# Patient Record
Sex: MALE | Race: WHITE | NOT HISPANIC OR LATINO | Employment: PART TIME | URBAN - METROPOLITAN AREA
[De-identification: names, ages, dates, MRNs, and addresses within clinical notes are randomized per-mention and may not be internally consistent; named-entity substitution may affect disease eponyms.]

---

## 2019-04-02 ENCOUNTER — APPOINTMENT (OUTPATIENT)
Dept: CT IMAGING | Facility: HOSPITAL | Age: 49
End: 2019-04-02

## 2019-04-02 ENCOUNTER — APPOINTMENT (OUTPATIENT)
Dept: GENERAL RADIOLOGY | Facility: HOSPITAL | Age: 49
End: 2019-04-02

## 2019-04-02 ENCOUNTER — HOSPITAL ENCOUNTER (EMERGENCY)
Facility: HOSPITAL | Age: 49
Discharge: HOME | End: 2019-04-02
Attending: EMERGENCY MEDICINE | Admitting: EMERGENCY MEDICINE

## 2019-04-02 VITALS
TEMPERATURE: 98.6 F | WEIGHT: 315 LBS | DIASTOLIC BLOOD PRESSURE: 98 MMHG | BODY MASS INDEX: 42.66 KG/M2 | RESPIRATION RATE: 16 BRPM | SYSTOLIC BLOOD PRESSURE: 143 MMHG | HEIGHT: 72 IN | HEART RATE: 86 BPM | OXYGEN SATURATION: 96 %

## 2019-04-02 DIAGNOSIS — Z86.69 HISTORY OF MIGRAINE: ICD-10-CM

## 2019-04-02 DIAGNOSIS — G43.909 MIGRAINE SYNDROME: Primary | ICD-10-CM

## 2019-04-02 LAB
ABO GROUP BLD: NORMAL
ALBUMIN SERPL-MCNC: 4.4 G/DL (ref 3.5–5.2)
ALBUMIN/GLOB SERPL: 1.5 G/DL
ALP SERPL-CCNC: 71 U/L (ref 39–117)
ALT SERPL W P-5'-P-CCNC: 31 U/L (ref 1–41)
ANION GAP SERPL CALCULATED.3IONS-SCNC: 13.4 MMOL/L
APTT PPP: 27.6 SECONDS (ref 22.7–35.4)
AST SERPL-CCNC: 26 U/L (ref 1–40)
BASOPHILS # BLD AUTO: 0.04 10*3/MM3 (ref 0–0.2)
BASOPHILS NFR BLD AUTO: 0.6 % (ref 0–1.5)
BILIRUB SERPL-MCNC: 0.5 MG/DL (ref 0.2–1.2)
BLD GP AB SCN SERPL QL: NEGATIVE
BUN BLD-MCNC: 15 MG/DL (ref 6–20)
BUN/CREAT SERPL: 14.3 (ref 7–25)
CALCIUM SPEC-SCNC: 9.7 MG/DL (ref 8.6–10.5)
CHLORIDE SERPL-SCNC: 102 MMOL/L (ref 98–107)
CO2 SERPL-SCNC: 26.6 MMOL/L (ref 22–29)
CREAT BLD-MCNC: 1.05 MG/DL (ref 0.76–1.27)
DEPRECATED RDW RBC AUTO: 46.2 FL (ref 37–54)
EOSINOPHIL # BLD AUTO: 0.09 10*3/MM3 (ref 0–0.4)
EOSINOPHIL NFR BLD AUTO: 1.5 % (ref 0.3–6.2)
ERYTHROCYTE [DISTWIDTH] IN BLOOD BY AUTOMATED COUNT: 14.2 % (ref 12.3–15.4)
GFR SERPL CREATININE-BSD FRML MDRD: 75 ML/MIN/1.73
GLOBULIN UR ELPH-MCNC: 3 GM/DL
GLUCOSE BLD-MCNC: 103 MG/DL (ref 65–99)
GLUCOSE BLDC GLUCOMTR-MCNC: 98 MG/DL (ref 70–130)
HCT VFR BLD AUTO: 49.1 % (ref 37.5–51)
HGB BLD-MCNC: 16.7 G/DL (ref 13–17.7)
HOLD SPECIMEN: NORMAL
HOLD SPECIMEN: NORMAL
IMM GRANULOCYTES # BLD AUTO: 0.01 10*3/MM3 (ref 0–0.05)
IMM GRANULOCYTES NFR BLD AUTO: 0.2 % (ref 0–0.5)
INR PPP: 0.95 (ref 0.9–1.1)
LYMPHOCYTES # BLD AUTO: 1.98 10*3/MM3 (ref 0.7–3.1)
LYMPHOCYTES NFR BLD AUTO: 32.1 % (ref 19.6–45.3)
MCH RBC QN AUTO: 30.4 PG (ref 26.6–33)
MCHC RBC AUTO-ENTMCNC: 34 G/DL (ref 31.5–35.7)
MCV RBC AUTO: 89.3 FL (ref 79–97)
MONOCYTES # BLD AUTO: 0.73 10*3/MM3 (ref 0.1–0.9)
MONOCYTES NFR BLD AUTO: 11.8 % (ref 5–12)
NEUTROPHILS # BLD AUTO: 3.32 10*3/MM3 (ref 1.4–7)
NEUTROPHILS NFR BLD AUTO: 53.8 % (ref 42.7–76)
NRBC BLD AUTO-RTO: 0 /100 WBC (ref 0–0)
PLATELET # BLD AUTO: 270 10*3/MM3 (ref 140–450)
PMV BLD AUTO: 9.5 FL (ref 6–12)
POTASSIUM BLD-SCNC: 4.1 MMOL/L (ref 3.5–5.2)
PROT SERPL-MCNC: 7.4 G/DL (ref 6–8.5)
PROTHROMBIN TIME: 12.3 SECONDS (ref 11.7–14.2)
RBC # BLD AUTO: 5.5 10*6/MM3 (ref 4.14–5.8)
RH BLD: POSITIVE
SODIUM BLD-SCNC: 142 MMOL/L (ref 136–145)
T&S EXPIRATION DATE: NORMAL
TROPONIN T SERPL-MCNC: <0.01 NG/ML (ref 0–0.03)
WBC NRBC COR # BLD: 6.17 10*3/MM3 (ref 3.4–10.8)
WHOLE BLOOD HOLD SPECIMEN: NORMAL
WHOLE BLOOD HOLD SPECIMEN: NORMAL

## 2019-04-02 PROCEDURE — 85610 PROTHROMBIN TIME: CPT | Performed by: EMERGENCY MEDICINE

## 2019-04-02 PROCEDURE — 99244 OFF/OP CNSLTJ NEW/EST MOD 40: CPT | Performed by: PSYCHIATRY & NEUROLOGY

## 2019-04-02 PROCEDURE — 71045 X-RAY EXAM CHEST 1 VIEW: CPT

## 2019-04-02 PROCEDURE — 96365 THER/PROPH/DIAG IV INF INIT: CPT

## 2019-04-02 PROCEDURE — 25010000002 METOCLOPRAMIDE PER 10 MG: Performed by: EMERGENCY MEDICINE

## 2019-04-02 PROCEDURE — 25010000002 MAGNESIUM SULFATE IN D5W 1G/100ML (PREMIX) 1-5 GM/100ML-% SOLUTION: Performed by: EMERGENCY MEDICINE

## 2019-04-02 PROCEDURE — 82962 GLUCOSE BLOOD TEST: CPT

## 2019-04-02 PROCEDURE — 82565 ASSAY OF CREATININE: CPT

## 2019-04-02 PROCEDURE — 86900 BLOOD TYPING SEROLOGIC ABO: CPT | Performed by: EMERGENCY MEDICINE

## 2019-04-02 PROCEDURE — 93010 ELECTROCARDIOGRAM REPORT: CPT | Performed by: INTERNAL MEDICINE

## 2019-04-02 PROCEDURE — 25010000003 LEVETIRACETAM IN NACL 0.75% 1000 MG/100ML SOLUTION: Performed by: EMERGENCY MEDICINE

## 2019-04-02 PROCEDURE — 25010000002 DIPHENHYDRAMINE PER 50 MG: Performed by: EMERGENCY MEDICINE

## 2019-04-02 PROCEDURE — 0042T HC CT CEREBRAL PERFUSION W/WO CONTRAST: CPT

## 2019-04-02 PROCEDURE — 70496 CT ANGIOGRAPHY HEAD: CPT

## 2019-04-02 PROCEDURE — 86901 BLOOD TYPING SEROLOGIC RH(D): CPT | Performed by: EMERGENCY MEDICINE

## 2019-04-02 PROCEDURE — 96375 TX/PRO/DX INJ NEW DRUG ADDON: CPT

## 2019-04-02 PROCEDURE — 99284 EMERGENCY DEPT VISIT MOD MDM: CPT

## 2019-04-02 PROCEDURE — 84484 ASSAY OF TROPONIN QUANT: CPT | Performed by: EMERGENCY MEDICINE

## 2019-04-02 PROCEDURE — 70498 CT ANGIOGRAPHY NECK: CPT

## 2019-04-02 PROCEDURE — 85730 THROMBOPLASTIN TIME PARTIAL: CPT | Performed by: EMERGENCY MEDICINE

## 2019-04-02 PROCEDURE — 86850 RBC ANTIBODY SCREEN: CPT | Performed by: EMERGENCY MEDICINE

## 2019-04-02 PROCEDURE — 96367 TX/PROPH/DG ADDL SEQ IV INF: CPT

## 2019-04-02 PROCEDURE — 85025 COMPLETE CBC W/AUTO DIFF WBC: CPT | Performed by: EMERGENCY MEDICINE

## 2019-04-02 PROCEDURE — 0 IOPAMIDOL PER 1 ML: Performed by: EMERGENCY MEDICINE

## 2019-04-02 PROCEDURE — 93005 ELECTROCARDIOGRAM TRACING: CPT | Performed by: EMERGENCY MEDICINE

## 2019-04-02 PROCEDURE — 80053 COMPREHEN METABOLIC PANEL: CPT | Performed by: EMERGENCY MEDICINE

## 2019-04-02 RX ORDER — METOCLOPRAMIDE HYDROCHLORIDE 5 MG/ML
10 INJECTION INTRAMUSCULAR; INTRAVENOUS ONCE
Status: COMPLETED | OUTPATIENT
Start: 2019-04-02 | End: 2019-04-02

## 2019-04-02 RX ORDER — SODIUM CHLORIDE 0.9 % (FLUSH) 0.9 %
10 SYRINGE (ML) INJECTION AS NEEDED
Status: DISCONTINUED | OUTPATIENT
Start: 2019-04-02 | End: 2019-04-02 | Stop reason: HOSPADM

## 2019-04-02 RX ORDER — MAGNESIUM SULFATE 1 G/100ML
1 INJECTION INTRAVENOUS ONCE
Status: COMPLETED | OUTPATIENT
Start: 2019-04-02 | End: 2019-04-02

## 2019-04-02 RX ORDER — DIPHENHYDRAMINE HYDROCHLORIDE 50 MG/ML
25 INJECTION INTRAMUSCULAR; INTRAVENOUS ONCE
Status: COMPLETED | OUTPATIENT
Start: 2019-04-02 | End: 2019-04-02

## 2019-04-02 RX ORDER — LEVETIRACETAM 10 MG/ML
1000 INJECTION INTRAVASCULAR ONCE
Status: COMPLETED | OUTPATIENT
Start: 2019-04-02 | End: 2019-04-02

## 2019-04-02 RX ADMIN — DIPHENHYDRAMINE HYDROCHLORIDE 25 MG: 50 INJECTION INTRAMUSCULAR; INTRAVENOUS at 19:51

## 2019-04-02 RX ADMIN — IOPAMIDOL 150 ML: 755 INJECTION, SOLUTION INTRAVENOUS at 19:03

## 2019-04-02 RX ADMIN — LEVETIRACETAM 1000 MG: 10 INJECTION INTRAVENOUS at 19:47

## 2019-04-02 RX ADMIN — MAGNESIUM SULFATE HEPTAHYDRATE 1 G: 1 INJECTION, SOLUTION INTRAVENOUS at 20:01

## 2019-04-02 RX ADMIN — METOCLOPRAMIDE 10 MG: 5 INJECTION, SOLUTION INTRAMUSCULAR; INTRAVENOUS at 19:57

## 2019-04-02 NOTE — ED NOTES
"Patient reports migraine onset 2 hours ago, no weakness, slowed and slurred speech, blind spots in right visual field, patient states \"I was reading, could see it but not processing it\" no numbness, reports left side tingle, \"but barely noticeable\"   Reports hx of this episode 25 years ago, but does not remember having the slow speech      Michaela Moreno, RN  04/02/19 1929    "

## 2019-04-02 NOTE — CONSULTS
Neurology Consult Note    Consult Date: 4/2/2019    Referring MD: Dr. Gupta    Reason for Consult I have been asked to see the patient in neurological consultation to render advice and opinion regarding acute vision loss, word finding difficulty    Bossman De La Rosa is a 48 y.o. male with PMH of migraine with aura, DVT, no prior history of CAD or stroke.  He has been having migraine with aura for about the last 25 years.  Several times a year he has an episode of mild word finding difficulty followed by pounding headache with photophobia and phonophobia.  This is usually relieved with NSAIDs.  Occasionally he has had a visual distortion on the right side of his vision.  About 2 hours ago he developed word finding difficulty and had difficulty reading.  He noticed a right-sided visual distortion that he describes as a gray field on the right half of his vision with a jagged bright line running across it.  He was able to see some text through the field but it was distorted.  His vision issues have improved but he still has a little bit of mild word finding difficulty.  He is beginning to develop a mild bifrontal aching headache with some mild photophobia and relatively severe nausea.  He came to the hospital because this is much worse than his typical migraine headache.    Past Medical/Surgical Hx:  Past Medical History:   Diagnosis Date   • DVT (deep venous thrombosis) (CMS/[facility])    • Eye exam, routine 08/2015     Past Surgical History:   Procedure Laterality Date   • COLONOSCOPY  2003    no polyps, low iron       Medications On Admission  none    Allergies:  No Known Allergies    Social Hx:  Social History     Socioeconomic History   • Marital status:      Spouse name: Not on file   • Number of children: Not on file   • Years of education: Not on file   • Highest education level: Not on file   Tobacco Use   • Smoking status: Never Smoker   Substance and Sexual Activity   • Alcohol use: No   • Drug use: Defer   •  "Sexual activity: Defer       Family Hx:  Family History   Problem Relation Age of Onset   • Cancer Mother         lung   • Diabetes Daughter    • Hypertension Maternal Grandmother    • Stroke Maternal Grandmother        Review of Systems  Constitutional: [No fevers, chills]  Eye: [No recent visual problems, eye discharge]  HEENT: [No ear pain, nasal congestion]  Respiratory: [No shortness of breath, cough]  Cardiovascular: [No Chest pain, palpitations]  Gastrointestinal: [No nausea, vomiting]  Genitourinary: [No hematuria, incontinence]  Hema/Lymph: [no nosebleeds, history of anticoagulation]  Endocrine: [Negative for excessive urination, heat or cold intolerance]  Musculoskeletal: [No back pain, neck pain]  Integumentary: [No rash, pruritus]  Neurologic: [No weakness, numbness, + HA]  Psychiatric: [No anxiety, depression]    Exam    BP (!) 145/119 (BP Location: Right arm, Patient Position: Lying)   Pulse 82   Temp 98.5 °F (36.9 °C) (Tympanic)   Resp 18   Ht 182.9 cm (72\")   Wt (!) 155 kg (341 lb)   SpO2 96%   BMI 46.25 kg/m²   gen: NAD, vitals reviewed  Eyes: fundus sharp with no papilledema or retinal hemorrhages  HEENT: no nuchal rigidity  CVS: RRR, S1, S2  MS: oriented x3, recent/remote memory intact, normal attention/concentration, language intact, no neglect, normal fund of knowledge  CN: visual acuity grossly normal, visual fields full, PERRL, EOMI, facial sensation equal, no facial droop, hearing symmetric, palate elevates symmetrically, shoulder shrug equal, tongue midline  Motor: 5/5 throughout upper and lower extremities, normal tone  Sensation: intact to vibration and temperature throughout  Reflexes: 2+ throughout upper and lower extremities, downgoing plantars  Coordination: no dysmetria with finger to nose bilaterally  Gait: no ataxia    DATA:    Lab Results   Component Value Date    GLUCOSE 103 (H) 04/02/2019    CALCIUM 9.7 04/02/2019     04/02/2019    K 4.1 04/02/2019    CO2 26.6 " 04/02/2019     04/02/2019    BUN 15 04/02/2019    CREATININE 1.05 04/02/2019    EGFRIFNONA 75 04/02/2019    BCR 14.3 04/02/2019    ANIONGAP 13.4 04/02/2019     Lab Results   Component Value Date    WBC 6.17 04/02/2019    HGB 16.7 04/02/2019    HCT 49.1 04/02/2019    MCV 89.3 04/02/2019     04/02/2019     No results found for: CHOL  No results found for: HDL  No results found for: LDL  No results found for: TRIG  No results found for: HGBA1C  Lab Results   Component Value Date    INR 0.95 04/02/2019    PROTIME 12.3 04/02/2019       Lab review: CBC, BMP unremarkable    Imaging review: I personally reviewed his CTH, CTA, CTP which were normal except an area of mild narrowing in one of his left MCA branches. I discussed these images with the reading radiologist Dr. Preston    Impression:  1) Migraine with visual aura  2) Migrainous aphasia  3) Intracranial atherosclerotic disease    Comment: History and exam consistent with migraine with aura. He's had migraine with word finding difficulty in the past, this is similar to prior episodes but the aura was more severe. He is improving now. He also had a right hemianopic distortion with fortification scotoma which has resolved and is also consistent with migraine aura. CTA shows a small area of intracranial atherosclerotic disease, potentially on the symptomatic side but I think this is more likely an incidental finding. I advised him to take magnesium as needed for migraine aura and to take ASA 81mg and have his cholesterol checked given the likely ICAD on CTA.    PLAN:  1. ASA 81mg  2. Asked him to visit his PCP and have his lipid panel checked  3. Prn magnesium 400mg for migraine aura  4. Can try migraine cocktail in the ED with magnesium, keppra, benadryl, reglan. If he improves he can go home from my standpoint. I offered him follow up with our practice.

## 2019-04-02 NOTE — ED PROVIDER NOTES
EMERGENCY DEPARTMENT ENCOUNTER    CHIEF COMPLAINT  Chief Complaint: HA  History given by: patient, family  History limited by: nothing  Room Number: 17/17  PMD: Mignon Adams MD (Inactive)      HPI:  Pt is a 48 y.o. male who presents complaining of HA that began 2 hours ago. Pt states that he also noticed that there were spots in his right fields of vision but denies numbness, weakness or additional complaint. Pt's spouse states that the pt also had word finding difficulties and memory issues (ex. could not name planets and he is normally able to do so). Pt had a similar episode 25 years ago but states his HA was much more severe previously. Pt denies having visual symptoms at that time. Pt's family states that the pt took 400mg ibuprofen for his HA with improvement of his speech.  Pt's family states that the pt has a history of migraine HAs.     Duration:  2 hours  Onset: gradual  Timing: constant  Location: generalized  Radiation: none mentioned  Quality: pressure  Intensity/Severity: moderate  Progression: improved  Associated Symptoms: word finding difficulties, memory issues, visual disturbance  Aggravating Factors: none  Alleviating Factors: none  Previous Episodes: Pt had a similar episode 25 years ago but states his HA was much more severe previously. Pt denies having visual symptoms at that time.  Treatment before arrival: Pt's family states that the pt took 400mg ibuprofen for his HA with improvement of his speech.     PAST MEDICAL HISTORY  Active Ambulatory Problems     Diagnosis Date Noted   • No Active Ambulatory Problems     Resolved Ambulatory Problems     Diagnosis Date Noted   • No Resolved Ambulatory Problems     Past Medical History:   Diagnosis Date   • DVT (deep venous thrombosis) (CMS/[facility])    • Eye exam, routine 08/2015       PAST SURGICAL HISTORY  Past Surgical History:   Procedure Laterality Date   • COLONOSCOPY  2003    no polyps, low iron       FAMILY HISTORY  Family History   Problem  Relation Age of Onset   • Cancer Mother         lung   • Diabetes Daughter    • Hypertension Maternal Grandmother    • Stroke Maternal Grandmother        SOCIAL HISTORY  Social History     Socioeconomic History   • Marital status: Legally      Spouse name: Not on file   • Number of children: Not on file   • Years of education: Not on file   • Highest education level: Not on file   Tobacco Use   • Smoking status: Never Smoker   Substance and Sexual Activity   • Alcohol use: No   • Drug use: Defer   • Sexual activity: Defer       ALLERGIES  Patient has no known allergies.    REVIEW OF SYSTEMS  Review of Systems   Constitutional: Negative for activity change, appetite change and fever.   HENT: Negative for congestion and sore throat.    Eyes: Negative.    Respiratory: Negative for cough and shortness of breath.    Cardiovascular: Negative for chest pain and leg swelling.   Gastrointestinal: Negative for abdominal pain, diarrhea and vomiting.   Endocrine: Negative.    Genitourinary: Negative for decreased urine volume and dysuria.   Musculoskeletal: Negative for neck pain.   Skin: Negative for rash and wound.   Allergic/Immunologic: Negative.    Neurological: Positive for speech difficulty and headaches. Negative for weakness and numbness.   Hematological: Negative.    Psychiatric/Behavioral: Negative.         (+) abnormal memory recall   All other systems reviewed and are negative.      PHYSICAL EXAM  ED Triage Vitals [04/02/19 1835]   Temp Heart Rate Resp BP SpO2   98.5 °F (36.9 °C) 82 18 -- 97 %      Temp src Heart Rate Source Patient Position BP Location FiO2 (%)   Tympanic -- -- -- --       Physical Exam   Constitutional: He is oriented to person, place, and time and well-developed, well-nourished, and in no distress. No distress.   HENT:   Mouth/Throat: Mucous membranes are normal.   Eyes: EOM are normal.   Cardiovascular: Normal rate and regular rhythm. Exam reveals no gallop and no friction rub.   No  murmur heard.  Pulmonary/Chest: Effort normal. No respiratory distress. He has no wheezes. He has no rhonchi. He has no rales.   Breath sounds are symmetric.   Abdominal: Soft. He exhibits no distension. There is no tenderness. There is no guarding.   Musculoskeletal: Normal range of motion. He exhibits no deformity.   Neurological: He is alert and oriented to person, place, and time. He displays abnormal speech (mild expressive aphasia).   Pt's speech is slow and deliberate. Pt reports minimal, subjective tingling in his LUE but has no decreased sensation on exam.   Skin: Skin is warm and dry.   Psychiatric:   Calm, cooperative       LAB RESULTS  Lab Results (last 24 hours)     Procedure Component Value Units Date/Time    POC Glucose Once [[id number]]  (Normal) Collected:  04/02/19 1841    Specimen:  Blood Updated:  04/02/19 1842     Glucose 98 mg/dL     CBC & Differential [[id number]] Collected:  04/02/19 1847    Specimen:  Blood Updated:  04/02/19 1904    Narrative:       The following orders were created for panel order CBC & Differential.  Procedure                               Abnormality         Status                     ---------                               -----------         ------                     CBC Auto Differential[[id number]]        Normal              Final result                 Please view results for these tests on the individual orders.    Comprehensive Metabolic Panel [[id number]]  (Abnormal) Collected:  04/02/19 1847    Specimen:  Blood Updated:  04/02/19 1922     Glucose 103 mg/dL      BUN 15 mg/dL      Creatinine 1.05 mg/dL      Sodium 142 mmol/L      Potassium 4.1 mmol/L      Chloride 102 mmol/L      CO2 26.6 mmol/L      Calcium 9.7 mg/dL      Total Protein 7.4 g/dL      Albumin 4.40 g/dL      ALT (SGPT) 31 U/L      AST (SGOT) 26 U/L      Alkaline Phosphatase 71 U/L      Total Bilirubin 0.5 mg/dL      eGFR Non African Amer 75 mL/min/1.73      Globulin 3.0 gm/dL      A/G Ratio 1.5 g/dL       BUN/Creatinine Ratio 14.3     Anion Gap 13.4 mmol/L     Narrative:       GFR Normal >60  Chronic Kidney Disease <60  Kidney Failure <15    Protime-INR [[id number]]  (Normal) Collected:  04/02/19 1847    Specimen:  Blood Updated:  04/02/19 1914     Protime 12.3 Seconds      INR 0.95    aPTT [[id number]]  (Normal) Collected:  04/02/19 1847    Specimen:  Blood Updated:  04/02/19 1914     PTT 27.6 seconds     Troponin [[id number]]  (Normal) Collected:  04/02/19 1847    Specimen:  Blood Updated:  04/02/19 1922     Troponin T <0.010 ng/mL     Narrative:       Troponin T Reference Range:  <= 0.03 ng/mL-   Negative for AMI  >0.03 ng/mL-     Abnormal for myocardial necrosis.  Clinicians would have to utilize clinical acumen, EKG, Troponin and serial changes to determine if it is an Acute Myocardial Infarction or myocardial injury due to an underlying chronic condition.     CBC Auto Differential [[id number]]  (Normal) Collected:  04/02/19 1847    Specimen:  Blood Updated:  04/02/19 1904     WBC 6.17 10*3/mm3      RBC 5.50 10*6/mm3      Hemoglobin 16.7 g/dL      Hematocrit 49.1 %      MCV 89.3 fL      MCH 30.4 pg      MCHC 34.0 g/dL      RDW 14.2 %      RDW-SD 46.2 fl      MPV 9.5 fL      Platelets 270 10*3/mm3      Neutrophil % 53.8 %      Lymphocyte % 32.1 %      Monocyte % 11.8 %      Eosinophil % 1.5 %      Basophil % 0.6 %      Immature Grans % 0.2 %      Neutrophils, Absolute 3.32 10*3/mm3      Lymphocytes, Absolute 1.98 10*3/mm3      Monocytes, Absolute 0.73 10*3/mm3      Eosinophils, Absolute 0.09 10*3/mm3      Basophils, Absolute 0.04 10*3/mm3      Immature Grans, Absolute 0.01 10*3/mm3      nRBC 0.0 /100 WBC           I ordered the above labs and reviewed the results    RADIOLOGY  XR Chest 1 View    (Results Pending)   CT Angiogram Head With & Without Contrast    (Results Pending)   CT Angiogram Neck With & Without Contrast    (Results Pending)   CT Cerebral Perfusion With & Without Contrast    (Results Pending)       CTA Head and Neck shows left MCA stenosis.     I ordered the above noted radiological studies. Interpreted by radiologist. Reviewed by me in PACS.       PROCEDURES  Procedures  1840  Interval: baseline(at presentation)  1a. Level of Consciousness: 0-->Alert, keenly responsive  1b. LOC Questions: 0-->Answers both questions correctly  1c. LOC Commands: 0-->Performs both tasks correctly  2. Best Gaze: 0-->Normal  3. Visual: 0-->No visual loss  4. Facial Palsy: 0-->Normal symmetrical movements  5a. Motor Arm, Left: 0-->No drift, limb holds 90 (or 45) degrees for full 10 secs  5b. Motor Arm, Right: 0-->No drift, limb holds 90 (or 45) degrees for full 10 secs  6a. Motor Leg, Left: 0-->No drift, leg holds 30 degree position for full 5 secs  6b. Motor Leg, Right: 0-->No drift, leg holds 30 degree position for full 5 secs  7. Limb Ataxia: 0-->Absent  8. Sensory: 0-->Normal, no sensory loss  9. Best Language: 1-->mild expressive aphasia  10. Dysarthria: 0-->Normal  11. Extinction and Inattention (formerly Neglect): 0-->No abnormality    Total (NIH Stroke Scale): 0    EKG          EKG time: 1936  Rhythm/Rate: NSR, 78  P waves and WA: unremarkable  QRS, axis: LAD, inferior Q waves   ST and T waves: no acute ischemic changes, no STEMI     Interpreted Contemporaneously by me, independently viewed  No prior for comparison    PROGRESS AND CONSULTS     1842- Discussed the pt's case with Dr. Beltrán (neurology), who recommends ordering the entire Team D protocol. He states that he will come evaluate the pt in the ED but that the pt will not likely receive tPA.    1844- Called Team D    1848- Rechecked pt. Pt is resting comfortably. Notified pt and family of my discussion with Dr. Beltrán (neurology). Discussed the plan to order lab and imaging studies for further evaluation. Pt understands and agrees with the plan, all questions answered.    1910- Dr. Beltrán is at bedside.    1920- Discussed the pt's case with Dr. Beltrán  (neurology), who states that the pt's symptoms are c/w a migraine and that he is going to talk to the pt about whether he is comfortable being treated for a migraine and discharged or admitted for further evaluation.     1935- Discussed the pt's case with Dr. Beltrán (neurology), who states that the pt is comfortable with the plan to order HA medication and for discharge. He recommends ordering magnesium, Keppra, Benadryl and Reglan prior to discharging the pt home as long as his symptoms are improved. He states that the pt should be admitted if his symptoms are not improved after medication.    1936- Ordered Reglan, Benadryl, Keppra and magnesium for MARES per Dr. Beltrán. Plan for discharge if improved.    2010 - Dr. Bryant to recheck after treatment for improvement. DC if improved, hospitalize for MRI if not per Neuro recommendation.    MEDICAL DECISION MAKING  Results were reviewed/discussed with the patient and they were also made aware of online access. Pt also made aware that some labs, such as cultures, will not be resulted during ER visit and follow up with PMD is necessary.     MDM  Number of Diagnoses or Management Options  History of migraine:   Migraine syndrome:   Diagnosis management comments: Patient with migraine vs stroke symptoms, opted to call Team D after quick discussion with Neurology Dr. Beltrán. Initial NIH 1. Dr. Beltrán evaluated imaging and patient at bedside, and feels this is more likely migraine. Have treated with migraine cocktail, plan for discharge if improved. Patient and family comfortable with this plan.        Amount and/or Complexity of Data Reviewed  Clinical lab tests: ordered and reviewed (INR=0.95)  Tests in the radiology section of CPT®: ordered and reviewed (CTA Head and Neck shows left MCA stenosis. )  Tests in the medicine section of CPT®: ordered and reviewed (See EKG procedure note)  Decide to obtain previous medical records or to obtain history from someone other  than the patient: yes  Obtain history from someone other than the patient: yes (family)  Discuss the patient with other providers: yes (Dr. Beltrán (neurology))  Independent visualization of images, tracings, or specimens: yes        Pt is not a tPA candidate due to NIHSS=1    DIAGNOSIS  Final diagnoses:   Migraine syndrome   History of migraine       DISPOSITION  Pending reevaluation after migraine cocktail. Discharge if improved. Hospitalize if no improvement.     Latest Documented Vital Signs:  As of 8:19 PM  BP- 132/84 HR- 77 Temp- 98.5 °F (36.9 °C) (Tympanic) O2 sat- 95%    --  Documentation assistance provided by nanci Dalton for Dr. Gupta.  Information recorded by the scribe was done at my direction and has been verified and validated by me.     Wendi Dalton  04/02/19 1947       Magnus Gupta MD  04/02/19 2020

## 2019-04-03 LAB — CREAT BLDA-MCNC: 0.9 MG/DL (ref 0.6–1.3)

## 2021-03-24 ENCOUNTER — BULK ORDERING (OUTPATIENT)
Dept: CASE MANAGEMENT | Facility: OTHER | Age: 51
End: 2021-03-24

## 2021-03-24 DIAGNOSIS — Z23 IMMUNIZATION DUE: ICD-10-CM

## 2024-05-09 ENCOUNTER — HOSPITAL ENCOUNTER (EMERGENCY)
Facility: HOSPITAL | Age: 54
Discharge: HOME | End: 2024-05-09
Attending: EMERGENCY MEDICINE
Payer: COMMERCIAL

## 2024-05-09 ENCOUNTER — APPOINTMENT (OUTPATIENT)
Dept: GENERAL RADIOLOGY | Facility: HOSPITAL | Age: 54
End: 2024-05-09
Payer: COMMERCIAL

## 2024-05-09 VITALS
TEMPERATURE: 97.8 F | DIASTOLIC BLOOD PRESSURE: 90 MMHG | SYSTOLIC BLOOD PRESSURE: 142 MMHG | BODY MASS INDEX: 42.66 KG/M2 | RESPIRATION RATE: 18 BRPM | HEART RATE: 90 BPM | OXYGEN SATURATION: 95 % | HEIGHT: 72 IN | WEIGHT: 315 LBS

## 2024-05-09 DIAGNOSIS — M79.672 LEFT FOOT PAIN: Primary | ICD-10-CM

## 2024-05-09 PROCEDURE — 99283 EMERGENCY DEPT VISIT LOW MDM: CPT

## 2024-05-09 PROCEDURE — 73630 X-RAY EXAM OF FOOT: CPT

## 2024-05-09 RX ORDER — IBUPROFEN 800 MG/1
800 TABLET ORAL ONCE
Status: COMPLETED | OUTPATIENT
Start: 2024-05-09 | End: 2024-05-09

## 2024-05-09 RX ADMIN — IBUPROFEN 800 MG: 800 TABLET, FILM COATED ORAL at 08:14

## 2024-05-09 NOTE — ED PROVIDER NOTES
Pt presents to the ED c/o acute progressive left foot pain after he tripped and fell over the weekend.  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  Foot pain has worsened through the week.  Continues to be able to ambulate.     On exam,   General: No acute distress, nontoxic  HEENT: EOMI  Pulm: Symmetric chest rise, nonlabored breathing  CV: Regular rate and rhythm  GI: Nondistended  MSK: No deformity, tenderness at the plantar arch towards the heel but no overt bony midfoot tenderness otherwise  Skin: Warm, dry  Neuro: Awake, alert, oriented x 4, moving all extremities, no focal deficits  Psych: Calm, cooperative    Vital signs and nursing notes reviewed.           Plan: Plan for x-ray of the foot to eval for any signs of fracture, other considerations for strain versus sprain, planta fasciitis considered as well.    ED Course as of 05/09/24 0812   Thu May 09, 2024   0750 XR Foot 3+ View Left  Per my independent interpretation of the left foot x-ray, no overtly obvious fracture noted [DC]      ED Course User Index  [DC] Magnsu Gupta MD       No evidence of fracture or dislocation, will address supportively with outpatient podiatry follow-up, ED return for worsening symptoms as needed.     MD Attestation Note    SHARED VISIT: This visit was performed by BOTH a physician and an APC. The substantive portion of the medical decision making was performed by this attesting physician who made or approved the management plan and takes responsibility for patient management. All studies in the APC note (if performed) were independently interpreted by me.                   Magnus Gupta MD  05/09/24 [id number]

## 2024-05-09 NOTE — Clinical Note
[facility] EMERGENCY DEPARTMENT  [address] [facility] [address]  Phone: [phone]    Bossman De La Rosa was seen and treated in our emergency department on 5/9/2024.  He may return to work on 05/15/2024.         Thank you for choosing [facility].    Randi Chao APRN

## 2024-05-09 NOTE — Clinical Note
[facility] EMERGENCY DEPARTMENT  [address] [place] [address]  Phone: [phone]    Bossman De La Rosa was seen and treated in our emergency department on 5/9/2024.  He may return to work on 05/13/2024.         Thank you for choosing [facility].    Magnus Gupta MD

## 2024-05-09 NOTE — ED PROVIDER NOTES
EMERGENCY DEPARTMENT ENCOUNTER  Room Number:  01/01  PCP: Mignon Adams MD (Inactive)  Independent Historians: Patient      HPI:  Chief Complaint: had concerns including Foot Pain.     A complete HPI/ROS/PMH/PSH/SH/FH are unobtainable due to:     Chronic or social conditions impacting patient care (Social Determinants of Health):       Context: Bossman De La Rosa is a 53 y.o. male with a medical history of hyperlipidemia, hypothyroidism and obesity who presents to the ED c/o acute left foot pain.    Patient states Saturday evening he walked his dog and tripped with his right foot, Monday night after working at [employer] havingh pain in L mid foot and now having heel pain  Recently started walking with a cane due to painbut this is abnormal for him  States taking ibuprofen is helping some  Patient states he works third shift at [employer] last night when he got off his left foot was hurting the worst that it had    Review of prior external notes (non-ED) -and- Review of prior external test results outside of this encounter:  Office visit 2/20/2024 seen by KAYA Cummins with primary care for hyperlipidemia, hypothyroidism and vitamin D deficiency, medication list includes Celebrex 200, vitamin D 25 mcg, Synthroid 200 mcg, Claritin 10 mg, multivitamin Crestor 5 mg    Prescription drug monitoring program review:         PAST MEDICAL HISTORY  Active Ambulatory Problems     Diagnosis Date Noted    No Active Ambulatory Problems     Resolved Ambulatory Problems     Diagnosis Date Noted    No Resolved Ambulatory Problems     Past Medical History:   Diagnosis Date    DVT (deep venous thrombosis)     Eye exam, routine 08/2015         PAST SURGICAL HISTORY  Past Surgical History:   Procedure Laterality Date    COLONOSCOPY  2003    no polyps, low iron         FAMILY HISTORY  Family History   Problem Relation Age of Onset    Cancer Mother         lung    Diabetes Daughter     Hypertension Maternal Grandmother     Stroke Maternal  Grandmother          SOCIAL HISTORY  Social History     Socioeconomic History    Marital status: Legally    Tobacco Use    Smoking status: Never   Substance and Sexual Activity    Alcohol use: No    Drug use: Defer    Sexual activity: Defer         ALLERGIES  Patient has no known allergies.      REVIEW OF SYSTEMS  Review of Systems  Included in HPI  All systems reviewed and negative except for those discussed in HPI.      PHYSICAL EXAM    I have reviewed the triage vital signs and nursing notes.    ED Triage Vitals [05/09/24 0723]   Temp Heart Rate Resp BP SpO2   97.8 °F (36.6 °C) (!) 130 18 -- 97 %      Temp src Heart Rate Source Patient Position BP Location FiO2 (%)   -- -- -- -- --       Physical Exam  GENERAL: alert, no acute distress  SKIN: Warm, dry and intact  HENT: Normocephalic, atraumatic  EYES: no scleral icterus, normal extraocular movements  CV: regular rhythm, regular rate, 1+ nonpitting bilateral lower extremity edema  RESPIRATORY: normal effort, lungs clear, able to speak in full sentences  ABDOMEN: soft, nontender, no rebound or guarding, abdominal obesity present  MUSCULOSKELETAL: no deformity, normal active range of motion all extremities, patient's left foot is atraumatic in appearance, there is mild reproducible tenderness to patient's plantar heel, no skin breakdown, DP and PT pulses are strong bilaterally  NEURO: alert, moves all extremities, follows commands            LAB RESULTS  No results found for this or any previous visit (from the past 24 hour(s)).      RADIOLOGY  XR Foot 3+ View Left    Result Date: 5/9/2024  XR FOOT 3+ VW LEFT-  HISTORY: 53-year-old male with foot pain following a fall.  FINDINGS: The 3rd-5th toes are not extended limiting evaluation of the phalanges. There is no convincing evidence for an acute fracture and there is no malalignment. If symptoms persist or worsen, coned-down views of the region of concern are recommended.         MEDICATIONS GIVEN IN  ER  Medications   ibuprofen (ADVIL,MOTRIN) tablet 800 mg (has no administration in time range)         ORDERS PLACED DURING THIS VISIT:  Orders Placed This Encounter   Procedures    XR Foot 3+ View Left         OUTPATIENT MEDICATION MANAGEMENT:  Current Facility-Administered Medications Ordered in Epic   Medication Dose Route Frequency Provider Last Rate Last Admin    ibuprofen (ADVIL,MOTRIN) tablet 800 mg  800 mg Oral Once Herth, Randi, KAYA         Current Outpatient Medications Ordered in Epic   Medication Sig Dispense Refill    diclofenac (VOLTAREN) 75 MG EC tablet            PROCEDURES  Procedures            PROGRESS, DATA ANALYSIS, CONSULTS, AND MEDICAL DECISION MAKING  All labs have been independently interpreted by me.  All radiology studies have been reviewed by me. All EKG's have been independently viewed and interpreted by me.  Discussion below represents my analysis of pertinent findings related to patient's condition, differential diagnosis, treatment plan and final disposition.    Differential diagnosis includes but is not limited to tendinitis, plantar fasciitis, stress fracture, contusion.                        ED Course as of 05/09/24 0812   Thu May 09, 2024   0750 XR Foot 3+ View Left  Per my independent interpretation of the left foot x-ray, no overtly obvious fracture noted [DC]      ED Course User Index  [DC] Magnus Gupta MD             AS OF 08:12 EDT VITALS:    BP -    HR - (!) 130  TEMP - 97.8 °F (36.6 °C)  O2 SATS - 97%    COMPLEXITY OF CARE  Admission was considered but after careful review of the patient's presentation, physical examination, diagnostic results, and response to treatment the patient may be safely discharged with outpatient follow-up.      DIAGNOSIS  Final diagnoses:   Left foot pain         DISPOSITION  ED Disposition       ED Disposition   Discharge    Condition   Stable    Comment   --                Please note that portions of this document were completed with a  voice recognition program.    Note Disclaimer: At [facility], we believe that sharing information builds trust and better relationships. You are receiving this note because you recently visited [facility]. It is possible you will see health information before a provider has talked with you about it. This kind of information can be easy to misunderstand. To help you fully understand what it means for your health, we urge you to discuss this note with your provider.         Randi Chao APRN  05/09/24 0812

## 2024-05-09 NOTE — ED NOTES
Patient to ER via car from home able to walk to triage states he was walking his dog on Saturday and hit his L foot  Complains of L foot pain    Patient is using a cane at time of triage states he does not normally use